# Patient Record
Sex: FEMALE | Race: WHITE | Employment: UNEMPLOYED | URBAN - METROPOLITAN AREA
[De-identification: names, ages, dates, MRNs, and addresses within clinical notes are randomized per-mention and may not be internally consistent; named-entity substitution may affect disease eponyms.]

---

## 2018-01-22 ENCOUNTER — APPOINTMENT (OUTPATIENT)
Dept: GENERAL RADIOLOGY | Age: 48
End: 2018-01-22
Attending: EMERGENCY MEDICINE
Payer: OTHER GOVERNMENT

## 2018-01-22 LAB
FLUAV AG NPH QL IA: NEGATIVE
FLUBV AG NPH QL IA: NEGATIVE

## 2018-01-22 PROCEDURE — 99283 EMERGENCY DEPT VISIT LOW MDM: CPT | Performed by: EMERGENCY MEDICINE

## 2018-01-22 PROCEDURE — 87804 INFLUENZA ASSAY W/OPTIC: CPT | Performed by: EMERGENCY MEDICINE

## 2018-01-22 PROCEDURE — 71046 X-RAY EXAM CHEST 2 VIEWS: CPT

## 2018-01-23 ENCOUNTER — HOSPITAL ENCOUNTER (EMERGENCY)
Age: 48
Discharge: HOME | End: 2018-01-23
Attending: EMERGENCY MEDICINE
Payer: OTHER GOVERNMENT

## 2018-01-23 VITALS
RESPIRATION RATE: 16 BRPM | OXYGEN SATURATION: 98 % | WEIGHT: 205 LBS | HEIGHT: 66 IN | DIASTOLIC BLOOD PRESSURE: 77 MMHG | HEART RATE: 108 BPM | TEMPERATURE: 100.9 F | SYSTOLIC BLOOD PRESSURE: 140 MMHG | BODY MASS INDEX: 32.95 KG/M2

## 2018-01-23 DIAGNOSIS — R50.9 ACUTE FEBRILE ILLNESS: Primary | ICD-10-CM

## 2018-01-23 LAB — DEPRECATED S PYO AG THROAT QL EIA: NEGATIVE

## 2018-01-23 PROCEDURE — 74011250637 HC RX REV CODE- 250/637: Performed by: EMERGENCY MEDICINE

## 2018-01-23 PROCEDURE — 87081 CULTURE SCREEN ONLY: CPT | Performed by: EMERGENCY MEDICINE

## 2018-01-23 PROCEDURE — 87880 STREP A ASSAY W/OPTIC: CPT | Performed by: EMERGENCY MEDICINE

## 2018-01-23 RX ORDER — AMOXICILLIN AND CLAVULANATE POTASSIUM 875; 125 MG/1; MG/1
1 TABLET, FILM COATED ORAL
Status: COMPLETED | OUTPATIENT
Start: 2018-01-23 | End: 2018-01-23

## 2018-01-23 RX ORDER — AMOXICILLIN AND CLAVULANATE POTASSIUM 875; 125 MG/1; MG/1
1 TABLET, FILM COATED ORAL 2 TIMES DAILY
Qty: 13 TAB | Refills: 0 | Status: SHIPPED | OUTPATIENT
Start: 2018-01-23 | End: 2018-03-01 | Stop reason: ALTCHOICE

## 2018-01-23 RX ORDER — IBUPROFEN 600 MG/1
600 TABLET ORAL ONCE
Status: COMPLETED | OUTPATIENT
Start: 2018-01-23 | End: 2018-01-23

## 2018-01-23 RX ADMIN — IBUPROFEN 600 MG: 600 TABLET, FILM COATED ORAL at 02:49

## 2018-01-23 RX ADMIN — AMOXICILLIN AND CLAVULANATE POTASSIUM 1 TABLET: 875; 125 TABLET, FILM COATED ORAL at 02:49

## 2018-01-23 NOTE — ED NOTES
Pt reports fever 104 starting this am, congestion, nausea, headache, left ear pain, body aches. Pt denies chest pain, dizziness, syncope, abdominal pain, vomiting, diarrhea, constipation, sob, urinary complaints, cough. nontender abdomen. +bowel sounds. Breath sounds clear and equal bilaterally. nonlabored breathing.

## 2018-01-23 NOTE — ED PROVIDER NOTES
HPI Comments: For somewhat over a week she has had some upper respiratory symptoms. During this time she has had fever and nasal congestion generalized achiness and mainly a nonproductive cough and states that she was somewhat worse at home tonight and took her temperature on a forehead thermometer resulting in 104 reading. Only new symptom is some left ear pain. Denies any sputum tonight. Patient is a 56 y.o. female presenting with flu symptoms. The history is provided by the patient. Flu   The current episode started more than 2 days ago. The problem occurs constantly. The cough is non-productive. Maximum temperature: 104. Associated symptoms include ear pain and sore throat. Pertinent negatives include no chills. She has tried nothing for the symptoms. She is not a smoker.         Past Medical History:   Diagnosis Date    Abnormal pap     colposcopy normal 2012    Allergic rhinitis     Anemia     Anxiety     Chronic tonsillitis     Depression     meds since approx 2007    Deviated septum     HTN (hypertension)     Hyperlipidemia     Hypothyroid     Interstitial cystitis     since 1997    Joint pain     Kidney infection     Pneumonia     Thyroid disease        Past Surgical History:   Procedure Laterality Date    HX COLPOSCOPY      HX OTHER SURGICAL  1997, 2012    cystoscopy     HX TONSILLECTOMY           Family History:   Problem Relation Age of Onset    Liver Disease Mother      cirrhosis (non-drinker)    Heart Disease Father     Stroke Father     Depression Father     Other Father      spinal stenosis    Dementia Father     Heart Disease Brother      unknown type       Social History     Social History    Marital status:      Spouse name: Martir Haider Number of children: 0    Years of education: N/A     Occupational History    stays at home      Social History Main Topics    Smoking status: Never Smoker    Smokeless tobacco: Never Used    Alcohol use 0.0 oz/week     0 Standard drinks or equivalent per week      Comment: social    Drug use: No    Sexual activity: Yes     Birth control/ protection: None     Other Topics Concern    Not on file     Social History Narrative     Homero Barahona works in [employer]: Sulfa (sulfonamide antibiotics)    Review of Systems   Constitutional: Positive for fever. Negative for chills and diaphoresis. HENT: Positive for congestion, ear pain and sore throat. Negative for facial swelling and mouth sores. Respiratory: Positive for cough. Musculoskeletal: Negative. Hematological: Negative. Psychiatric/Behavioral: Negative. All other systems reviewed and are negative. Vitals:    01/22/18 2137   BP: 133/78   Pulse: (!) 110   Resp: 18   Temp: 100.3 °F (37.9 °C)   SpO2: 96%   Weight: 93 kg (205 lb)   Height: 5' 6\" (1.676 m)            Physical Exam   Constitutional: She appears well-developed and well-nourished. No distress. Not toxic or septic   HENT:   Head: Atraumatic. Right Ear: Tympanic membrane and ear canal normal.   Left Ear: Tympanic membrane and ear canal normal.   Nose: Nose normal.   Mouth/Throat: Oropharyngeal exudate present. Red posterior throat  Scant yellow exudate  No strep odor   Eyes: No scleral icterus. Neck: Neck supple. Slight submandibular tenderness no dramatic lymphadenopathy appreciated   Cardiovascular: Normal rate and intact distal pulses. Pulmonary/Chest: Effort normal. No respiratory distress. Abdominal: Soft. There is no rebound and no guarding. Musculoskeletal: Normal range of motion. She exhibits no edema or deformity. Neurological: She is alert. No cranial nerve deficit. Coordination normal.   Skin: Skin is warm and dry. Psychiatric: Thought content normal.   Nursing note and vitals reviewed.        MDM  Number of Diagnoses or Management Options  Diagnosis management comments: multisymptom febrile illness including sore throat and left ear pain generalized body aches and a nonproductive cough. Consistent with a flulike illness. Flu swabs were done and negative. Chest x-ray also shows no focal infiltrate and patient has no sputum. She does have some left ear pain and a slight amount of exudate to the posterior pharynx. This could be either drainage or other.        Amount and/or Complexity of Data Reviewed  Clinical lab tests: ordered and reviewed  Tests in the radiology section of CPT®: reviewed and ordered  Obtain history from someone other than the patient: yes    Risk of Complications, Morbidity, and/or Mortality  Presenting problems: moderate  Diagnostic procedures: low  Management options: moderate    Patient Progress  Patient progress: stable    ED Course       Procedures    Recent Results (from the past 12 hour(s))   INFLUENZA A & B AG (RAPID TEST)    Collection Time: 01/22/18  9:46 PM   Result Value Ref Range    Influenza A Ag NEGATIVE  NEG      Influenza B Ag NEGATIVE  NEG

## 2018-01-25 LAB
BACTERIA SPEC CULT: NORMAL
SERVICE CMNT-IMP: NORMAL

## 2018-07-06 PROBLEM — E66.01 SEVERE OBESITY (BMI 35.0-39.9): Status: ACTIVE | Noted: 2018-07-06

## 2023-03-03 ENCOUNTER — HOSPITAL ENCOUNTER (OUTPATIENT)
Dept: MAMMOGRAPHY | Age: 53
Discharge: HOME | End: 2023-03-03
Payer: OTHER GOVERNMENT

## 2023-03-03 DIAGNOSIS — Z12.31 ENCOUNTER FOR SCREENING MAMMOGRAM FOR MALIGNANT NEOPLASM OF BREAST: ICD-10-CM

## 2023-03-03 PROCEDURE — 77067 SCR MAMMO BI INCL CAD: CPT

## 2023-06-19 NOTE — PROGRESS NOTES
[facility]  Consultation Note    Patient ID:  Name: Heidi Trivedi  MRN: [id number]  AGE: 47 y.o.  : 1970    Date of Consultation:  2023    CC:   Chief Complaint   Patient presents with    Back Pain         HPI:  Ms. Dee Lee is a 60-year-old female who presents today for evaluation of low back pain. I last saw her in 2019 with pain in the low back. The pain is in the low back. It radiates to the buttocks into the bilateral posterior lateral legs. It stops at her knees. The pain is associated with lower extremity paresthesias. The pain is aggravated with standing or walking. It is alleviated with sitting. She has not recently been awakening at night because of the pain but does take some medications at bed that make her sleep better. The pain is associated with a positive grocery cart sign. She has perform physician guided home exercises daily since 2022 and continuing currently. These have not helped. She rates the pain as 8/10 in severity. The pain severely affects her functioning. Cymbalta has not helped. Tizanidine and gabapentin have helped some. She has not had any recent lumbar spine imaging. MRI lumbar spine 2019 showed central stenosis at L3-4 and multilevel degenerative change.      Past Medical History Includes:   Past Medical History:   Diagnosis Date    Abnormal pap     colposcopy normal     Allergic rhinitis     Anemia     Anxiety     Chronic tonsillitis     Depression     meds since 2007    Deviated septum     HTN (hypertension)     Hyperlipidemia     Hypothyroid     Interstitial cystitis     since     Joint pain     Kidney infection     Pneumonia     Thyroid disease    ,   Past Surgical History:   Procedure Laterality Date    COLPOSCOPY      OTHER SURGICAL HISTORY  ,     cystoscopy     TONSILLECTOMY       Family History:   Family History   Problem Relation Age of Onset    Liver Disease Mother         cirrhosis

## 2023-06-21 ENCOUNTER — OFFICE VISIT (OUTPATIENT)
Dept: ORTHOPEDIC SURGERY | Age: 53
End: 2023-06-21
Payer: OTHER GOVERNMENT

## 2023-06-21 ENCOUNTER — TELEPHONE (OUTPATIENT)
Dept: ORTHOPEDIC SURGERY | Age: 53
End: 2023-06-21

## 2023-06-21 DIAGNOSIS — M47.816 LUMBAR SPONDYLOSIS: ICD-10-CM

## 2023-06-21 DIAGNOSIS — M48.062 SPINAL STENOSIS OF LUMBAR REGION WITH NEUROGENIC CLAUDICATION: ICD-10-CM

## 2023-06-21 DIAGNOSIS — M51.36 DISC DEGENERATION, LUMBAR: ICD-10-CM

## 2023-06-21 DIAGNOSIS — M54.50 LOW BACK PAIN, UNSPECIFIED BACK PAIN LATERALITY, UNSPECIFIED CHRONICITY, UNSPECIFIED WHETHER SCIATICA PRESENT: Primary | ICD-10-CM

## 2023-06-21 PROCEDURE — 99204 OFFICE O/P NEW MOD 45 MIN: CPT | Performed by: PHYSICAL MEDICINE & REHABILITATION

## 2023-06-21 RX ORDER — HYDROCHLOROTHIAZIDE 25 MG/1
TABLET ORAL
COMMUNITY
Start: 2023-05-02

## 2023-06-21 RX ORDER — LISDEXAMFETAMINE DIMESYLATE 70 MG/1
CAPSULE ORAL
COMMUNITY
Start: 2023-06-10

## 2023-06-21 RX ORDER — BREXPIPRAZOLE 3 MG/1
TABLET ORAL
COMMUNITY
Start: 2023-04-21

## 2023-06-21 RX ORDER — HYDROXYZINE PAMOATE 25 MG/1
CAPSULE ORAL
COMMUNITY
Start: 2023-04-21

## 2023-06-21 RX ORDER — ALPRAZOLAM 1 MG/1
TABLET ORAL
Qty: 1 TABLET | Refills: 0 | Status: SHIPPED | OUTPATIENT
Start: 2023-06-21 | End: 2023-06-21

## 2023-06-21 RX ORDER — BUPROPION HYDROBROMIDE 522 MG/1
TABLET, EXTENDED RELEASE ORAL
COMMUNITY
Start: 2023-05-12

## 2023-06-21 RX ORDER — TIZANIDINE 4 MG/1
TABLET ORAL
COMMUNITY
Start: 2023-03-27

## 2023-06-21 NOTE — TELEPHONE ENCOUNTER
They received a RX for a Xanax. She is already on clonazepam. Is it okay to fill the Xanax? Please give them a call.

## 2023-06-22 NOTE — TELEPHONE ENCOUNTER
Call returned to pharmacy to let the know it was okay to fill the prescription due to it being a single dose prescription for the MRI.

## 2023-08-28 NOTE — PROGRESS NOTES
tobacco: Never   Substance Use Topics    Alcohol use: Yes     Alcohol/week: 0.0 standard drinks       ALLERGIES:   Allergies   Allergen Reactions    Sulfa Antibiotics Other (See Comments)     GI distress        Patient Medications    Current Outpatient Medications   Medication Sig    ALPRAZolam (XANAX) 1 MG tablet Take 1 tablet by mouth one hour prior to procedure. methocarbamol (ROBAXIN-750) 750 MG tablet Take 1 tablet by mouth 3 times daily    REXULTI 3 MG TABS tablet     APLENZIN 522 MG TB24     hydrOXYzine pamoate (VISTARIL) 25 MG capsule     VYVANSE 70 MG capsule     hydroCHLOROthiazide (HYDRODIURIL) 25 MG tablet     tiZANidine (ZANAFLEX) 4 MG tablet     acetaminophen (TYLENOL) 500 MG tablet Take by mouth every 6 hours as needed    busPIRone (BUSPAR) 15 MG tablet Take 15 mg by mouth 3 times daily    clonazePAM (KLONOPIN) 0.5 MG tablet Take 0.5 mg by mouth 2 times daily.     diphenhydrAMINE-APAP, sleep, (TYLENOL PM EXTRA STRENGTH)  MG tablet Take by mouth    DULoxetine (CYMBALTA) 60 MG extended release capsule Take 120 mg by mouth daily    Gabapentin, Once-Daily, 600 MG TABS 3 tabs PO daily with dinner Indications: take 3 tablets once daily with dinner    HYDROcodone-acetaminophen (NORCO) 7.5-325 MG per tablet 1 tab po daily prn severe pain    levothyroxine (SYNTHROID) 125 MCG tablet Take 125 mcg by mouth every morning (before breakfast)    lisinopril-hydroCHLOROthiazide (PRINZIDE;ZESTORETIC) 20-12.5 MG per tablet Take 1 tablet by mouth daily    loratadine (CLARITIN) 10 MG tablet Take 10 mg by mouth daily    meloxicam (MOBIC) 15 MG tablet Take 15 mg by mouth daily    montelukast (SINGULAIR) 10 MG tablet Take 10 mg by mouth daily    nebivolol (BYSTOLIC) 10 MG tablet Take 10 mg by mouth daily    nitrofurantoin (MACRODANTIN) 100 MG capsule TAKE 1 CAP BY MOUTH NIGHTLY.    simvastatin (ZOCOR) 20 MG tablet TAKE 1 TABLET AT BEDTIME    tolterodine (DETROL LA) 4 MG extended release capsule TAKE 1 CAPSULE EVERY

## 2023-08-30 ENCOUNTER — OFFICE VISIT (OUTPATIENT)
Dept: ORTHOPEDIC SURGERY | Age: 53
End: 2023-08-30
Payer: OTHER GOVERNMENT

## 2023-08-30 DIAGNOSIS — M54.16 LUMBAR RADICULOPATHY: ICD-10-CM

## 2023-08-30 DIAGNOSIS — M47.816 LUMBAR SPONDYLOSIS: ICD-10-CM

## 2023-08-30 DIAGNOSIS — M51.36 DISC DEGENERATION, LUMBAR: Primary | ICD-10-CM

## 2023-08-30 PROCEDURE — 99214 OFFICE O/P EST MOD 30 MIN: CPT | Performed by: PHYSICAL MEDICINE & REHABILITATION

## 2023-08-30 RX ORDER — ALPRAZOLAM 1 MG/1
TABLET ORAL
Qty: 1 TABLET | Refills: 0 | Status: SHIPPED | OUTPATIENT
Start: 2023-08-30 | End: 2023-09-30

## 2023-08-30 RX ORDER — METHOCARBAMOL 750 MG/1
750 TABLET, FILM COATED ORAL 3 TIMES DAILY
Qty: 60 TABLET | Refills: 1 | Status: SHIPPED | OUTPATIENT
Start: 2023-08-30 | End: 2023-09-29

## 2023-09-11 ENCOUNTER — OFFICE VISIT (OUTPATIENT)
Dept: ORTHOPEDIC SURGERY | Age: 53
End: 2023-09-11
Payer: OTHER GOVERNMENT

## 2023-09-11 DIAGNOSIS — M47.816 LUMBAR SPONDYLOSIS: ICD-10-CM

## 2023-09-11 DIAGNOSIS — M54.16 LUMBAR RADICULOPATHY: ICD-10-CM

## 2023-09-11 DIAGNOSIS — M51.36 DISC DEGENERATION, LUMBAR: Primary | ICD-10-CM

## 2023-09-11 PROCEDURE — 64493 INJ PARAVERT F JNT L/S 1 LEV: CPT | Performed by: PHYSICAL MEDICINE & REHABILITATION

## 2023-09-11 PROCEDURE — 64494 INJ PARAVERT F JNT L/S 2 LEV: CPT | Performed by: PHYSICAL MEDICINE & REHABILITATION

## 2023-09-11 RX ORDER — BETAMETHASONE SODIUM PHOSPHATE AND BETAMETHASONE ACETATE 3; 3 MG/ML; MG/ML
12 INJECTION, SUSPENSION INTRA-ARTICULAR; INTRALESIONAL; INTRAMUSCULAR; SOFT TISSUE ONCE
Status: CANCELLED | OUTPATIENT
Start: 2023-09-11 | End: 2023-09-11

## 2023-09-11 RX ORDER — TRIAMCINOLONE ACETONIDE 40 MG/ML
40 INJECTION, SUSPENSION INTRA-ARTICULAR; INTRAMUSCULAR ONCE
Status: COMPLETED | OUTPATIENT
Start: 2023-09-11 | End: 2023-09-11

## 2023-09-11 RX ADMIN — TRIAMCINOLONE ACETONIDE 40 MG: 40 INJECTION, SUSPENSION INTRA-ARTICULAR; INTRAMUSCULAR at 16:22

## 2023-09-11 NOTE — PROGRESS NOTES
Name: Savanah Becerra  YOB: 1970  Gender: female  MRN: [id number]    Procedure: Bilateral  L3-L4 and L4-L5 facet joint injections     Precautions: Savanah Becerra deniesprior sensitivity to steroid, local anesthetic, iodine, or shellfish. The procedure was discussed at length with her and informed consent was signed and placed in the chart. She was placed in a prone position on the fluoroscopy table and the skin was prepped and draped in a routine sterile fashion. The areas to be injected were each anesthetized with 1% lidocaine. Next, a 25-gauge 3.5 inch spinal needle was carefully advanced under fluoroscopic guidance to the rightL4 - L5 facet joint. Aspiration was negative. Once proper placement was confirmed, 1 ml of 0.25% Marcaine and  0.5 mL 40 mg/mL kenalog were injected through the spinal needle. The above procedure was then repeated through the rightL3 - L4, and left L4 - L5, and leftL3 - L4 facet joints. Fluoroscopic guidance was used intermittently over a 10-minute period to insure proper needle placement and her safety. A hard copy of the fluoroscopic images has been placed in her chart and is saved on the C-arm hard drive. She was monitored for 30 minutes after the procedure and discharged home in a stable fashion with a routine follow up. Procedural Diagnosis:     ICD-10-CM    1. Disc degeneration, lumbar  M51.36 FL INJ LUMB/SAC FACET SINGLE LEVEL     XR INJ FACET LUMB SACRAL 2ND LVL     triamcinolone acetonide (KENALOG-40) injection 40 mg      2. Lumbar spondylosis  M47.816 FL INJ LUMB/SAC FACET SINGLE LEVEL     XR INJ FACET LUMB SACRAL 2ND LVL     triamcinolone acetonide (KENALOG-40) injection 40 mg      3.  Lumbar radiculopathy  M54.16 FL INJ LUMB/SAC FACET SINGLE LEVEL     XR INJ FACET LUMB SACRAL 2ND LVL     triamcinolone acetonide (KENALOG-40) injection 40 mg           Nicole Melendrez MD  09/11/23

## 2024-04-24 ENCOUNTER — OFFICE VISIT (OUTPATIENT)
Dept: ORTHOPEDIC SURGERY | Age: 54
End: 2024-04-24
Payer: OTHER GOVERNMENT

## 2024-04-24 VITALS — HEIGHT: 66 IN | BODY MASS INDEX: 37.93 KG/M2 | WEIGHT: 236 LBS

## 2024-04-24 DIAGNOSIS — M25.562 LEFT KNEE PAIN, UNSPECIFIED CHRONICITY: ICD-10-CM

## 2024-04-24 DIAGNOSIS — M54.16 LUMBAR RADICULOPATHY: ICD-10-CM

## 2024-04-24 DIAGNOSIS — M25.561 RIGHT KNEE PAIN, UNSPECIFIED CHRONICITY: Primary | ICD-10-CM

## 2024-04-24 DIAGNOSIS — M51.36 DISC DEGENERATION, LUMBAR: ICD-10-CM

## 2024-04-24 DIAGNOSIS — M47.816 LUMBAR SPONDYLOSIS: ICD-10-CM

## 2024-04-24 PROCEDURE — 99214 OFFICE O/P EST MOD 30 MIN: CPT | Performed by: PHYSICAL MEDICINE & REHABILITATION

## 2024-04-24 RX ORDER — BREXPIPRAZOLE 2 MG/1
TABLET ORAL
COMMUNITY
Start: 2024-04-07

## 2024-04-24 RX ORDER — SIMVASTATIN 40 MG
TABLET ORAL
COMMUNITY
Start: 2024-01-25

## 2024-04-24 RX ORDER — ALPRAZOLAM 1 MG/1
TABLET ORAL
Qty: 1 TABLET | Refills: 0 | Status: SHIPPED | OUTPATIENT
Start: 2024-04-24 | End: 2024-05-30

## 2024-04-24 NOTE — PROGRESS NOTES
[facility]  Consultation Note    Patient ID:  Name: Pretty Mercedes  MRN: [id number]  AGE: 53 y.o.  : 1970    Date of Consultation:  2024    CC:   Chief Complaint   Patient presents with    Back Pain         HPI:  Ms. Mercedes is a 53-year-old female who presents today for follow-up of low back pain.  She has pain in the central and bilateral low back.  The pain radiates to her bilateral hips.  She underwent bilateral L3-4-5 facet joint injection 2023.  She is not sure if that helped.  She also has pain in the bilateral knees.  The pain is associated with lower extremity paresthesias, only when she sits in a crosslegged position on the floor.  Her back pain is aggravated with standing still.  It is alleviated when she changes her weight position from leg to leg while standing.  She has a positive grocery cart sign.  The pain does not awaken her at night.  She tried tizanidine without relief.  She has just started methocarbamol today and is not sure if that has helped or not.  She is chronically on meloxicam 15 mg a day, Cymbalta 60 mg a day, and gralise 1800 mg at bedtime.    MRI lumbar spine 2022 showed multilevel degenerative disc changes, spondylosis, facet arthropathy of the lumbar spine with lateral recess narrowing bilaterally at L4-5.     Past Medical History Includes:   Past Medical History:   Diagnosis Date    Abnormal pap     colposcopy normal     Allergic rhinitis     Anemia     Anxiety     Chronic tonsillitis     Depression     meds since 2007    Deviated septum     HTN (hypertension)     Hyperlipidemia     Hypothyroid     Interstitial cystitis     since     Joint pain     Kidney infection     Pneumonia     Thyroid disease    ,   Past Surgical History:   Procedure Laterality Date    COLPOSCOPY      OTHER SURGICAL HISTORY  ,     cystoscopy     TONSILLECTOMY       Family History:   Family History   Problem Relation Age of Onset

## 2024-04-26 DIAGNOSIS — M51.36 DISC DEGENERATION, LUMBAR: ICD-10-CM

## 2024-04-26 DIAGNOSIS — M48.062 SPINAL STENOSIS OF LUMBAR REGION WITH NEUROGENIC CLAUDICATION: ICD-10-CM

## 2024-04-26 DIAGNOSIS — M54.50 LOW BACK PAIN, UNSPECIFIED BACK PAIN LATERALITY, UNSPECIFIED CHRONICITY, UNSPECIFIED WHETHER SCIATICA PRESENT: ICD-10-CM

## 2024-04-26 DIAGNOSIS — M54.16 LUMBAR RADICULOPATHY: Primary | ICD-10-CM

## 2024-05-02 ENCOUNTER — OFFICE VISIT (OUTPATIENT)
Dept: ORTHOPEDIC SURGERY | Age: 54
End: 2024-05-02
Payer: OTHER GOVERNMENT

## 2024-05-02 DIAGNOSIS — M51.36 DISC DEGENERATION, LUMBAR: ICD-10-CM

## 2024-05-02 DIAGNOSIS — M54.16 LUMBAR RADICULOPATHY: Primary | ICD-10-CM

## 2024-05-02 DIAGNOSIS — M48.062 SPINAL STENOSIS OF LUMBAR REGION WITH NEUROGENIC CLAUDICATION: ICD-10-CM

## 2024-05-02 DIAGNOSIS — M54.50 LOW BACK PAIN, UNSPECIFIED BACK PAIN LATERALITY, UNSPECIFIED CHRONICITY, UNSPECIFIED WHETHER SCIATICA PRESENT: ICD-10-CM

## 2024-05-02 PROCEDURE — 64483 NJX AA&/STRD TFRM EPI L/S 1: CPT | Performed by: PHYSICAL MEDICINE & REHABILITATION

## 2024-05-02 RX ORDER — TRIAMCINOLONE ACETONIDE 40 MG/ML
40 INJECTION, SUSPENSION INTRA-ARTICULAR; INTRAMUSCULAR ONCE
Status: COMPLETED | OUTPATIENT
Start: 2024-05-02 | End: 2024-05-02

## 2024-05-02 RX ADMIN — TRIAMCINOLONE ACETONIDE 40 MG: 40 INJECTION, SUSPENSION INTRA-ARTICULAR; INTRAMUSCULAR at 10:11

## 2024-05-02 NOTE — PROGRESS NOTES
triamcinolone acetonide (KENALOG-40) injection 40 mg      2. Disc degeneration, lumbar  M51.36 FL NERVE BLOCK LUMBOSACRAL 1ST     triamcinolone acetonide (KENALOG-40) injection 40 mg      3. Low back pain, unspecified back pain laterality, unspecified chronicity, unspecified whether sciatica present  M54.50 FL NERVE BLOCK LUMBOSACRAL 1ST     triamcinolone acetonide (KENALOG-40) injection 40 mg      4. Spinal stenosis of lumbar region with neurogenic claudication  M48.062 FL NERVE BLOCK LUMBOSACRAL 1ST     triamcinolone acetonide (KENALOG-40) injection 40 mg         JULISSA SMITH MD  05/02/24

## 2024-06-05 ENCOUNTER — OFFICE VISIT (OUTPATIENT)
Dept: ORTHOPEDIC SURGERY | Age: 54
End: 2024-06-05
Payer: OTHER GOVERNMENT

## 2024-06-05 DIAGNOSIS — M22.42 CHONDROMALACIA, PATELLA, LEFT: Primary | ICD-10-CM

## 2024-06-05 DIAGNOSIS — M22.41 CHONDROMALACIA, PATELLA, RIGHT: ICD-10-CM

## 2024-06-05 PROCEDURE — 99203 OFFICE O/P NEW LOW 30 MIN: CPT | Performed by: STUDENT IN AN ORGANIZED HEALTH CARE EDUCATION/TRAINING PROGRAM

## 2024-06-05 RX ORDER — MELOXICAM 7.5 MG/1
7.5 TABLET ORAL DAILY PRN
Qty: 30 TABLET | Refills: 1 | Status: SHIPPED | OUTPATIENT
Start: 2024-06-05 | End: 2024-08-04

## 2024-06-05 NOTE — PROGRESS NOTES
Name: Pretty Mercedes  YOB: 1970  Gender: female  MRN: [id number]  Date of Encounter:  6/5/2024       CHIEF COMPLAINT:     Chief Complaint   Patient presents with    Knee Pain     Bilateral        SUBJECTIVE/OBJECTIVE:      HPI:    Patient is a 53 y.o. pleasant female who presents today for a new evaluation of her bilateral knee pain. She is a patient of Dr. Hudson for her back and receives injections.     Mrs. Mercedes describes bilateral knee pain that began a few months ago whenever she is bending/squatting and standing. She notes popping/clicking noises with no pain associated. She cannot recall any DAVID. She denies any previous history of injury or any swelling to the joint. Mrs. Mercedes has never had any knee CSIs or PT. She has chronic history of lumbar spine DDD, stenosis, and radiculopathy.      PAST HISTORY:   Past medical, surgical, family, social history and allergies reviewed by me.   Pertinent history:   Tobacco use:  reports that she has never smoked. She has never used smokeless tobacco.  Diabetes: none  No results found for: \"LABA1C\"  Anticoagulation: no      REVIEW OF SYSTEMS:   As noted in HPI.     PHYSICAL EXAMINATION:     Gen: Well-developed, no acute distress   HEENT: NC/AT, EOMI   Neck: Trachea midline, normal ROM   CV: Regular rhythm by palpation of distal pulse, normal capillary refill   Pulm: No respiratory distress, no stridor   Psychiatric: Well oriented, normal mood and affect.   Skin: No rashes, lesions or ulcers, normal temperature, turgor, and texture on uninvolved extremity.      ORTHO EXAM:    Bilateral Knee:     Inspection: No deformity, No erythema  Palpation:  No effusion ; mild anterior and medial crepitus, Patellar mobility normal  ROM: 130 flexion, 0 extension, slow to flex and stiff   Tenderness: Medial joint line and patellar facet tenderness medially  Provocative testing: Patellar grind positive.  Medial Wendi is painful.  No laxity with anterior posterior drawer,

## 2024-06-11 ENCOUNTER — HOSPITAL ENCOUNTER (OUTPATIENT)
Dept: PHYSICAL THERAPY | Age: 54
Setting detail: RECURRING SERIES
Discharge: HOME | End: 2024-06-14
Attending: STUDENT IN AN ORGANIZED HEALTH CARE EDUCATION/TRAINING PROGRAM
Payer: OTHER GOVERNMENT

## 2024-06-11 DIAGNOSIS — G89.29 CHRONIC PAIN OF BOTH KNEES: Primary | ICD-10-CM

## 2024-06-11 DIAGNOSIS — M25.662 KNEE JOINT STIFFNESS, BILATERAL: ICD-10-CM

## 2024-06-11 DIAGNOSIS — M25.561 CHRONIC PAIN OF BOTH KNEES: Primary | ICD-10-CM

## 2024-06-11 DIAGNOSIS — M62.81 MUSCLE WEAKNESS (GENERALIZED): ICD-10-CM

## 2024-06-11 DIAGNOSIS — M25.562 CHRONIC PAIN OF BOTH KNEES: Primary | ICD-10-CM

## 2024-06-11 DIAGNOSIS — M25.661 KNEE JOINT STIFFNESS, BILATERAL: ICD-10-CM

## 2024-06-11 DIAGNOSIS — R26.2 DIFFICULTY IN WALKING, NOT ELSEWHERE CLASSIFIED: ICD-10-CM

## 2024-06-11 PROCEDURE — 97161 PT EVAL LOW COMPLEX 20 MIN: CPT

## 2024-06-11 PROCEDURE — 97110 THERAPEUTIC EXERCISES: CPT

## 2024-06-11 ASSESSMENT — PAIN SCALES - GENERAL: PAINLEVEL_OUTOF10: 5

## 2024-06-11 NOTE — THERAPY EVALUATION
performance of ADL's with greater ease  Independent HEP of comprehensive LE strengthening exercises         Outcome Measure:   Tool Used: Lower Extremity Functional Scale (LEFS)  Score:  Initial: 24/80 Most Recent: X/80 (Date: -- )   Interpretation of Score: 20 questions each scored on a 5 point scale with 0 representing \"extreme difficulty or unable to perform\" and 4 representing \"no difficulty\".  The lower the score, the greater the functional disability. 80/80 represents no disability.  Minimal detectable change is 9 points.    Medical Necessity:   > Patient is expected to demonstrate progress in strength, range of motion, and balance to allow for performance of ADL's with greater ease.  Reason For Services/Other Comments:  >  Patient will require skilled therapeutic intervention to address impairments/deficits assessed to allow for performance of ADL's with greater ease      Regarding Pretty Mercedes's therapy, I certify that the treatment plan above will be carried out by a therapist or under their direction.  Thank you for this referral,  Louis Snow PT     Referring Physician Signature: Nevin Jenkins MD                    Charge Capture  Events  Appt Desk  Attendance Report

## 2024-06-12 NOTE — PROGRESS NOTES
Pretty Mago  : 1970  Primary:  [facility] (Other)  Secondary:  [facility]  [address]  [address]  [facility] [address]  Phone: [phone]  Fax: [phone] Plan Frequency: 2-3x week for 6 weeks    Plan of Care/Certification Expiration Date: 08/10/24        Plan of Care/Certification Expiration Date:  Plan of Care/Certification Expiration Date: 08/10/24    Frequency/Duration:   Plan Frequency: 2-3x week for 6 weeks      Time In/Out:   Time In: 0815  Time Out: 0900      PT Visit Info:    Total # of Visits to Date: 1      Visit Count:  1    OUTPATIENT PHYSICAL THERAPY:   Treatment Note 2024       Episode  (Bilateral Knee pain)               Treatment Diagnosis:    Chronic pain of both knees  Knee joint stiffness, bilateral  Muscle weakness (generalized)  Difficulty in walking, not elsewhere classified  Medical/Referring Diagnosis:    Chondromalacia, patella, 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 Patient also reports pain with stair climbing; worse with descending stairs with limitations in standing to 5 minutes and walking to less than 15 minutes   Initial Pain Level::     5/10  Post Session Pain Level:        /10  Medications Last Reviewed:  2024  Updated Objective Findings:  See Evaluation Note from today  Treatment   THERAPEUTIC EXERCISE: (23 minutes):    Exercises per grid below to improve mobility and strength.  Required minimal verbal cues to promote proper body alignment.  Progressed range

## 2024-06-24 ENCOUNTER — HOSPITAL ENCOUNTER (OUTPATIENT)
Dept: PHYSICAL THERAPY | Age: 54
Setting detail: RECURRING SERIES
Discharge: HOME | End: 2024-06-27
Attending: STUDENT IN AN ORGANIZED HEALTH CARE EDUCATION/TRAINING PROGRAM
Payer: OTHER GOVERNMENT

## 2024-06-24 PROCEDURE — 97110 THERAPEUTIC EXERCISES: CPT

## 2024-06-24 ASSESSMENT — PAIN SCALES - GENERAL: PAINLEVEL_OUTOF10: 3

## 2024-06-24 NOTE — PROGRESS NOTES
Desdune-Chris, Louis C, PT [facility]   7/2/2024  9:00 AM Desdune-Chris, Louis C, PT [facility]   7/3/2024  9:00 AM Desdune-Chris, Louis C, PT [facility]   7/8/2024  9:45 AM Desdune-Chris, Louis C, PT [facility]   7/10/2024  2:30 PM Desdune-Chris, Louis C, PT [facility]   7/15/2024  9:00 AM Desdune-Chris, Louis C, PT [facility]   7/17/2024  9:00 AM Desdune-Chris, Louis C, PT [facility]   7/22/2024  9:00 AM Desdune-Chris, Louis C, PT [facility]   7/24/2024  9:00 AM Desdune-Chris, Louis C, PT [facility]   7/26/2024 11:00 AM Desdune-Chris, Louis C, PT [facility]   7/31/2024  9:00 AM Desdune-Chris, Louis C, PT [facility]   7/31/2024  2:45 PM Nevin Jenkins MD [facility]

## 2024-06-25 ENCOUNTER — HOSPITAL ENCOUNTER (OUTPATIENT)
Dept: NUTRITION | Age: 54
Setting detail: RECURRING SERIES
Discharge: HOME | End: 2024-06-28
Payer: OTHER GOVERNMENT

## 2024-06-25 PROCEDURE — 97802 MEDICAL NUTRITION INDIV IN: CPT

## 2024-06-25 NOTE — PROGRESS NOTES
Kaya Aleman, MS, RD, LD  Outpatient Registered Dietitian  [facility]  Phone: [phone]  Fax: [phone]  INITIAL ASSESSMENT (6/25/2024)  Pretty Mercedes is a 53 y.o. female referred with the following diagnosis (es): Fatty (change of) liver, not elsewhere classified [K76.0]   PMH includes HTN, HLD, hypothyroidism, anxiety, depression, DDD  Labs: 11/6/2023 Total Chol 207 (H) TRIG 228 (H) HDL 46 .4 (H)   Meds: simvastatin, hctz,     Patient stated goal: weight loss    Started tracking her meals using harjeet but has made no changes to diet yet. Identifies she needs to drink less sweet tea. Typically skips lunch, eats out majority of meals,  will cook on a weekend day. Patient doesn't like to cook and unable to due to back pain. Only eats vegetables when spouse cooks at home, she doesn't love fruit.     Will eat: tomatoes, broccoli, cooked carrots, cucumbers, mushrooms, strawberries, apples, bananas, grapes, pears, pineapple     Factors impacting food choices:   -Established food preferences/eating behaviors  -Dining out more than 50% of meals  -Lack of planning for meals/shopping  -Current nutrition attitudes/beliefs  -Poor snack choices  -Skips meals    Initial Diet Recall (kept 3-day log in [employer]et):   6/21: B: 8oz OJ, Bocarla rochelle-berry biscuit, 2 pork sausages, 8oz water  L:  D: 16oz sweet tea, Tipsy Taco 2 soft tacos with beef, cheese, lettuce, 4 churros, 1/2 cup vanilla ice cream  Snack: 1 cup skim milk, 1.5 chips ahoy cookies     6/22: B: 8oz water, 2 blueberry eggo waffles, 2 TB light syrup, 1 TB light butter, 8oz OJ  L:   D: 5 fried shrimp, Outback everything baked sweet potato, 1 TB butter, side salad with ranch, 3 slices italian bread, 8oz fluid sweet tea  Snack: 8oz sweet tea, 2 chips ahoy cookies     6/23: B: Tucker maple brown sugar potato bread, 10oz OJ  L: 36oz sweet tea, 1/2 Brixx roasted chicken pizza, 1/2 piece tiramisu cake   D: 4 oz honey

## 2024-06-26 ENCOUNTER — HOSPITAL ENCOUNTER (OUTPATIENT)
Dept: PHYSICAL THERAPY | Age: 54
Setting detail: RECURRING SERIES
Discharge: HOME | End: 2024-06-29
Attending: STUDENT IN AN ORGANIZED HEALTH CARE EDUCATION/TRAINING PROGRAM
Payer: OTHER GOVERNMENT

## 2024-06-26 PROCEDURE — 97110 THERAPEUTIC EXERCISES: CPT

## 2024-06-26 ASSESSMENT — PAIN SCALES - GENERAL: PAINLEVEL_OUTOF10: 4

## 2024-06-26 NOTE — PROGRESS NOTES
Pretty Mago  : 1970  Primary:  [facility] (Other)  Secondary:  [facility]  [address]  [address]  [facility] [address]  Phone: [phone]  Fax: [phone] Plan Frequency: 2-3x week for 6 weeks    Plan of Care/Certification Expiration Date: 08/10/24        Plan of Care/Certification Expiration Date:  Plan of Care/Certification Expiration Date: 08/10/24    Frequency/Duration:   Plan Frequency: 2-3x week for 6 weeks      Time In/Out:   Time In: 0900  Time Out: 0950      PT Visit Info:    Total # of Visits to Date: 2      Visit Count:  3    OUTPATIENT PHYSICAL THERAPY:   Treatment Note 2024       Episode  (Bilateral Knee pain)               Treatment Diagnosis:    Chronic pain of both knees  Knee joint stiffness, bilateral  Muscle weakness (generalized)  Difficulty in walking, not elsewhere classified  Medical/Referring Diagnosis:    Chondromalacia, patella, 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  Low back pain remains at 5/6 with movement  Initial Pain Level::     4/10  Post Session Pain Level:       3/10  Medications Last Reviewed:  2024  Updated Objective Findings:  None Today  Treatment   THERAPEUTIC EXERCISE: (45 minutes):    Exercises per grid below to improve mobility, strength, and coordination.  Required minimal visual and verbal cues to promote proper body alignment.  Progressed range, repetitions, and complexity of movement as indicated.   Date:  24 Date:  24 Date:  24   Activity/Exercise Parameters Parameters Parameters   Patient

## 2024-07-01 ENCOUNTER — APPOINTMENT (OUTPATIENT)
Dept: PHYSICAL THERAPY | Age: 54
End: 2024-07-01
Attending: STUDENT IN AN ORGANIZED HEALTH CARE EDUCATION/TRAINING PROGRAM
Payer: OTHER GOVERNMENT

## 2024-07-02 ENCOUNTER — HOSPITAL ENCOUNTER (OUTPATIENT)
Dept: PHYSICAL THERAPY | Age: 54
Setting detail: RECURRING SERIES
Discharge: HOME | End: 2024-07-05
Attending: STUDENT IN AN ORGANIZED HEALTH CARE EDUCATION/TRAINING PROGRAM
Payer: OTHER GOVERNMENT

## 2024-07-02 PROCEDURE — 97110 THERAPEUTIC EXERCISES: CPT

## 2024-07-02 ASSESSMENT — PAIN SCALES - GENERAL: PAINLEVEL_OUTOF10: 3

## 2024-07-02 NOTE — PROGRESS NOTES
In: 0900  Time Out: 0945    Louis Snow, PT         Charge Capture  Events  [employer] Portal  Appt Desk  Attendance Report     Future Appointments   Date Time Provider Department Center   7/3/2024  9:00 AM Louis Snow, PT [facility]   7/8/2024  9:45 AM Louis Snow, PT [facility]   7/10/2024  2:30 PM Louis Snow, PT [facility]   7/15/2024  9:00 AM DesLouis Noriega, PT [facility]   7/17/2024  9:00 AM Louis Snow, PT [facility]   7/22/2024  9:00 AM Descyndi-Louis Perez, PT [facility]   7/24/2024  9:00 AM Louis Snow, PT [facility]   7/26/2024 11:00 AM Descyndi-Louis Perez, PT [facility]   7/31/2024  9:00 AM Desdune-Louis Perez, PT [facility]   7/31/2024  2:45 PM Nevin Jenkins MD [facility]

## 2024-07-03 ENCOUNTER — HOSPITAL ENCOUNTER (OUTPATIENT)
Dept: PHYSICAL THERAPY | Age: 54
Setting detail: RECURRING SERIES
Discharge: HOME | End: 2024-07-06
Attending: STUDENT IN AN ORGANIZED HEALTH CARE EDUCATION/TRAINING PROGRAM
Payer: OTHER GOVERNMENT

## 2024-07-03 PROCEDURE — 97110 THERAPEUTIC EXERCISES: CPT

## 2024-07-03 ASSESSMENT — PAIN SCALES - GENERAL: PAINLEVEL_OUTOF10: 3

## 2024-07-03 NOTE — PROGRESS NOTES
Pretty Mago  : 1970  Primary:  [facility]  Secondary:  [facility]  [address]  [address]  [facility] [address]  Phone: [phone]  Fax: [phone] Plan Frequency: 2-3x week for 6 weeks    Plan of Care/Certification Expiration Date: 08/10/24        Plan of Care/Certification Expiration Date:  Plan of Care/Certification Expiration Date: 08/10/24    Frequency/Duration:   Plan Frequency: 2-3x week for 6 weeks      Time In/Out:   Time In: 830  Time Out: 915      PT Visit Info:    Total # of Visits to Date: 5      Visit Count:  5    OUTPATIENT PHYSICAL THERAPY:   Treatment Note 7/3/2024       Episode  (Bilateral Knee pain)               Treatment Diagnosis:    Chronic pain of both knees  Knee joint stiffness, bilateral  Muscle weakness (generalized)  Difficulty in walking, not elsewhere classified  Medical/Referring Diagnosis:    Chondromalacia, patella, 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]  Chondromalacia, patella, 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]      Referring Physician:  Nevin Jenkins MD MD Orders:  PT Eval and Treat   Return MD Appt:  24   Date of Onset:  Onset Date: 23     Allergies:   Sulfa antibiotics  Restrictions/Precautions:   None      Interventions Planned (Treatment may consist of any combination of the following):     See Assessment Note    Subjective Comments:   Patient reports doing better with her knee, but her low back pain remains at 6/10  Initial Pain Level::     3/10  Post Session Pain Level:       3/10  Medications Last Reviewed:  7/3/2024  Updated Objective Findings:  None Today  Treatment   THERAPEUTIC EXERCISE: (40 minutes):    Exercises per grid below to improve mobility, strength, and coordination.  Required minimal visual and verbal cues to promote proper body alignment.  Progressed range, repetitions, and complexity of movement as indicated.   Date:  24 Date:  24 Date:  24 Date:  24 Date:  7/3/24   Activity/Exercise Parameters Parameters Parameters     Patient

## 2024-07-08 ENCOUNTER — HOSPITAL ENCOUNTER (OUTPATIENT)
Dept: PHYSICAL THERAPY | Age: 54
Setting detail: RECURRING SERIES
Discharge: HOME | End: 2024-07-11
Attending: STUDENT IN AN ORGANIZED HEALTH CARE EDUCATION/TRAINING PROGRAM
Payer: OTHER GOVERNMENT

## 2024-07-08 PROCEDURE — 97110 THERAPEUTIC EXERCISES: CPT

## 2024-07-08 ASSESSMENT — PAIN SCALES - GENERAL: PAINLEVEL_OUTOF10: 5

## 2024-07-08 NOTE — PROGRESS NOTES
Activity/Exercise Parameters Parameters Parameters      Patient education/ HEP 8 Updated HEP       LTR 10x  10x   10x with ball  10x hold 5 sec   SKTC 3 x 20 secs; B  2 x 30 sec; B 2 x 30 sec; B     Hamstring stretch 3 x 20sec 2 x 30 sec; B    Supine w strap 2 x 30 sec; B   Calf stretch 3 x 20 sec 2 x 30 sec; B 2 x 30 sec; B 30 sec; B w wedge 2 x 30 sec; B w wedge 2 x 30 sec; B w wedge   Nu Step  5 min L1 for ROM 6 min L1 8 min L1 8 min L1 10 min L2   TA bracing  10x       SLR with QS  2 x 10; B 2 x 10; B 2 x 10; B with Ta 2 x 10; B with Ta 2 x 10 w 1#; B   Pelvic tilt  10x w breath 10x 10x w ball 10x 10x hold 3 sec   Clams  10x;B Hooklying with LTB 10x  SL 10x;B  2 x 10; B in sidelying 10x w OTB; B   Supine march  10x 10x 2 x 10     SAQ  2 x 10;B 10x w hold: B      Heel raises  2 x 10 2 x 10 2 x 10 2 x 10 2 x 10   Hip abd   10x; B  2 x 10; B    SLS    On airex w hip abd 10x; B On airex w hip abd 10x; B     Bridge    Isometric 8X  Beginner 5x   Beginner 10x   10x w ball   Lateral walking    4 x 15 w LTB  4 x 15ft w LTB   Hamstring curl with ball     10x hold 10 sec    Seated lumbar flex     Center and oblique 10x Center and oblique 10x w ball   Piriformis stretch      2 x 30 sec; B     Split Portal Access Code: [email or web address]  URL: [email or web address]  Date: 06/24/2024  Prepared by: Louis Snow    Exercises  - Supine Lower Trunk Rotation  - 2 x daily - 7 x weekly - 1 sets - 10 reps - 3 hold  - Hooklying Single Knee to Chest  - 2 x daily - 7 x weekly - 1 sets - 3 reps - 20 hold  - Supine Posterior Pelvic Tilt  - 1 x daily - 7 x weekly - 1 sets - 10 reps - 3 hold  - Seated Hamstring Stretch  - 2 x daily - 7 x weekly - 1 sets - 20 reps - 3 hold  - Gastroc Stretch on Wall  - 2 x daily - 7 x weekly - 1 sets - 3 reps - 20 hold  - Active Straight Leg Raise with Quad Set  - 1 x daily - 7 x weekly - 2 sets - 10 reps  - Standing Heel Raise with Support  - 1 x daily - 7 x weekly - 2 sets - 10

## 2024-07-10 ENCOUNTER — HOSPITAL ENCOUNTER (OUTPATIENT)
Dept: PHYSICAL THERAPY | Age: 54
Setting detail: RECURRING SERIES
Discharge: HOME | End: 2024-07-13
Attending: STUDENT IN AN ORGANIZED HEALTH CARE EDUCATION/TRAINING PROGRAM
Payer: OTHER GOVERNMENT

## 2024-07-10 PROCEDURE — 97110 THERAPEUTIC EXERCISES: CPT

## 2024-07-10 NOTE — PROGRESS NOTES
Pretty Mago  : 1970  Primary:  [facility]  Secondary:  [facility]  [address]  [address]  [facility] [address]  Phone: [phone]  Fax: [phone] Plan Frequency: 2-3x week for 6 weeks    Plan of Care/Certification Expiration Date: 08/10/24        Plan of Care/Certification Expiration Date:  Plan of Care/Certification Expiration Date: 08/10/24    Frequency/Duration:   Plan Frequency: 2-3x week for 6 weeks      Time In/Out:          PT Visit Info:    Total # of Visits to Date: 6      Visit Count:  7    OUTPATIENT PHYSICAL THERAPY:   Treatment Note 7/10/2024       Episode  (Bilateral Knee pain)               Treatment Diagnosis:    Chronic pain of both knees  Knee joint stiffness, bilateral  Muscle weakness (generalized)  Difficulty in walking, not elsewhere classified  Medical/Referring Diagnosis:    Chondromalacia, patella, 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  Low back pain remains unchanged  Initial Pain Level::      /10  Post Session Pain Level:        /10  Medications Last Reviewed:  7/10/2024  Updated Objective Findings:  None Today  Treatment   THERAPEUTIC EXERCISE: (40 minutes):    Exercises per grid below to improve mobility, strength, and coordination.  Required minimal visual and verbal cues to promote proper body alignment.  Progressed range, repetitions, and complexity of movement as indicated.   Date:  24 Date:  24 Date:  24 Date:  24 Date:  7/3/24 Date:  24 Date:  7/10/24

## 2024-07-15 ENCOUNTER — HOSPITAL ENCOUNTER (OUTPATIENT)
Dept: PHYSICAL THERAPY | Age: 54
Setting detail: RECURRING SERIES
Discharge: HOME | End: 2024-07-18
Attending: STUDENT IN AN ORGANIZED HEALTH CARE EDUCATION/TRAINING PROGRAM
Payer: OTHER GOVERNMENT

## 2024-07-15 PROCEDURE — 97110 THERAPEUTIC EXERCISES: CPT

## 2024-07-15 ASSESSMENT — PAIN SCALES - GENERAL: PAINLEVEL_OUTOF10: 4

## 2024-07-15 NOTE — PROGRESS NOTES
Pretty Mago  : 1970  Primary:  [facility]  Secondary:  [facility]  [address]  [address]  [facility] [address]  Phone: [phone]  Fax: [phone] Plan Frequency: 2-3x week for 6 weeks    Plan of Care/Certification Expiration Date: 08/10/24        Plan of Care/Certification Expiration Date:  Plan of Care/Certification Expiration Date: 08/10/24    Frequency/Duration:   Plan Frequency: 2-3x week for 6 weeks      Time In/Out:   Time In: 0900  Time Out: 09      PT Visit Info:    Total # of Visits to Date: 8      Visit Count:  8    OUTPATIENT PHYSICAL THERAPY:   Treatment Note 7/15/2024       Episode  (Bilateral Knee pain)               Treatment Diagnosis:    Chronic pain of both knees  Knee joint stiffness, bilateral  Muscle weakness (generalized)  Difficulty in walking, not elsewhere classified  Medical/Referring Diagnosis:    Chondromalacia, patella, 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  Low back did feel better post estim and ice last session  Initial Pain Level::     4/10  Post Session Pain Level:       2/10  Medications Last Reviewed:  7/15/2024  Updated Objective Findings:  None Today  Treatment   THERAPEUTIC EXERCISE: (40 minutes):    Exercises per grid below to improve mobility, strength, and coordination.  Required minimal visual and verbal cues to promote proper body alignment.  Progressed range, repetitions, and complexity of movement as indicated.   Date:  24 Date:  24 Date:  24 Date:  24 Date:  7/3/24 Date:  24 Date:  7/10/24 Date:  7/15/24

## 2024-07-17 ENCOUNTER — HOSPITAL ENCOUNTER (OUTPATIENT)
Dept: PHYSICAL THERAPY | Age: 54
Setting detail: RECURRING SERIES
Discharge: HOME | End: 2024-07-20
Attending: STUDENT IN AN ORGANIZED HEALTH CARE EDUCATION/TRAINING PROGRAM
Payer: OTHER GOVERNMENT

## 2024-07-17 PROCEDURE — 97110 THERAPEUTIC EXERCISES: CPT

## 2024-07-17 ASSESSMENT — PAIN SCALES - GENERAL: PAINLEVEL_OUTOF10: 5

## 2024-07-17 NOTE — PROGRESS NOTES
Pretty Mago  : 1970  Primary:  [facility]  Secondary:  [facility]  [address]  [address]  [facility] [address]  Phone: [phone]  Fax: [phone] Plan Frequency: 2-3x week for 6 weeks    Plan of Care/Certification Expiration Date: 08/10/24        Plan of Care/Certification Expiration Date:  Plan of Care/Certification Expiration Date: 08/10/24    Frequency/Duration:   Plan Frequency: 2-3x week for 6 weeks      Time In/Out:   Time In: 0900  Time Out: 945      PT Visit Info:    Total # of Visits to Date: 9      Visit Count:  9    OUTPATIENT PHYSICAL THERAPY:   Treatment Note 2024       Episode  (Bilateral Knee pain)               Treatment Diagnosis:    Chronic pain of both knees  Knee joint stiffness, bilateral  Muscle weakness (generalized)  Difficulty in walking, not elsewhere classified  Medical/Referring Diagnosis:    Chondromalacia, patella, 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  Knee pain also doing be  Initial Pain Level::     5/10  Post Session Pain Level:       5/10  Medications Last Reviewed:  2024  Updated Objective Findings:  None Today  Treatment   THERAPEUTIC EXERCISE: (43 minutes):    Exercises per grid below to improve mobility, strength, and coordination.  Required minimal visual and verbal cues to promote proper body alignment.  Progressed range, repetitions, and complexity of movement as indicated.   Date:  24 Date:  24 Date:  24 Date:  24 Date:  7/3/24 Date:  24 Date:  7/10/24 Date:  7/15/24

## 2024-07-22 ENCOUNTER — HOSPITAL ENCOUNTER (OUTPATIENT)
Dept: PHYSICAL THERAPY | Age: 54
Setting detail: RECURRING SERIES
Discharge: HOME | End: 2024-07-25
Attending: STUDENT IN AN ORGANIZED HEALTH CARE EDUCATION/TRAINING PROGRAM
Payer: OTHER GOVERNMENT

## 2024-07-22 PROCEDURE — 97110 THERAPEUTIC EXERCISES: CPT

## 2024-07-22 NOTE — PROGRESS NOTES
Provider Department [place]   7/24/2024  9:00 AM Louis Snow, PT [facility]   7/26/2024 11:00 AM Louis Snow PT [facility]   7/31/2024  9:00 AM Louis Snow PT [facility]   7/31/2024  2:45 PM Nevin Jenkins MD [facility]

## 2024-07-24 ENCOUNTER — HOSPITAL ENCOUNTER (OUTPATIENT)
Dept: PHYSICAL THERAPY | Age: 54
Setting detail: RECURRING SERIES
Discharge: HOME | End: 2024-07-27
Attending: STUDENT IN AN ORGANIZED HEALTH CARE EDUCATION/TRAINING PROGRAM
Payer: OTHER GOVERNMENT

## 2024-07-24 DIAGNOSIS — R26.2 DIFFICULTY IN WALKING, NOT ELSEWHERE CLASSIFIED: Primary | ICD-10-CM

## 2024-07-24 DIAGNOSIS — G89.29 CHRONIC PAIN OF BOTH KNEES: ICD-10-CM

## 2024-07-24 DIAGNOSIS — M25.561 CHRONIC PAIN OF BOTH KNEES: ICD-10-CM

## 2024-07-24 DIAGNOSIS — M25.562 CHRONIC PAIN OF BOTH KNEES: ICD-10-CM

## 2024-07-24 PROCEDURE — 97110 THERAPEUTIC EXERCISES: CPT

## 2024-07-24 NOTE — PROGRESS NOTES
None  Recommendations/Intent for next treatment session: Next visit will focus on ROM, strength and conditioning, gait and balance training progressing as tolerated.  Manual therapy and modalities as needed.    >Total Treatment Billable Duration:  40 minutes (40 minutes TE)  Time In: 0900  Time Out: 1000    Louis Snow PT         Charge Capture  Events  [employer] Portal  Appt Desk  Attendance Report     Future Appointments   Date Time Provider Department Center   7/26/2024 11:00 AM Louis Snow PT [facility]   7/31/2024  9:00 AM Louis Snow PT [facility]   7/31/2024  2:45 PM Nevin Jenkins MD [facility]

## 2024-07-26 ENCOUNTER — HOSPITAL ENCOUNTER (OUTPATIENT)
Dept: PHYSICAL THERAPY | Age: 54
Setting detail: RECURRING SERIES
Discharge: HOME | End: 2024-07-29
Attending: STUDENT IN AN ORGANIZED HEALTH CARE EDUCATION/TRAINING PROGRAM
Payer: OTHER GOVERNMENT

## 2024-07-26 PROCEDURE — 97110 THERAPEUTIC EXERCISES: CPT

## 2024-07-26 ASSESSMENT — PAIN SCALES - GENERAL: PAINLEVEL_OUTOF10: 5

## 2024-07-26 NOTE — PROGRESS NOTES
Department Center   7/31/2024  9:00 AM Louis Snow, PT [facility]   7/31/2024  2:45 PM Nevin Jenkins MD [facility]

## 2024-07-29 ENCOUNTER — APPOINTMENT (OUTPATIENT)
Dept: PHYSICAL THERAPY | Age: 54
End: 2024-07-29
Attending: STUDENT IN AN ORGANIZED HEALTH CARE EDUCATION/TRAINING PROGRAM
Payer: OTHER GOVERNMENT

## 2024-07-30 NOTE — PROGRESS NOTES
Name: Pretty Mercedes  YOB: 1970  Gender: female  MRN: [id number]  Date of Encounter:  7/31/2024       CHIEF COMPLAINT:     Chief Complaint   Patient presents with    Follow-up     Bilateral Knee        SUBJECTIVE/OBJECTIVE:      HPI:    Patient is a 53 y.o. pleasant female who presents today for a return evaluation of her bilateral knee.    Working diagnosis:   1. Chondromalacia, patella, left    2. Chondromalacia, patella, right       LOV: 6/5/2024     Recall Hx:  Mrs. Mercedes describes bilateral knee pain 2/2 patellar chondromalacia that began a few months ago whenever she is bending/squatting and standing. She notes popping/clicking noises with no pain associated. She cannot recall any DAVID. She denies any previous history of injury or any swelling to the joint. Mrs. Mercedes has never had any knee CSIs or PT. She has chronic history of lumbar spine DDD, stenosis, and radiculopathy.     6/5/24: Given referral for formal therapy. We discussed injections which I advised we hold on for now, trial NSAIDs and therapy.     7/31/24: She has had 12 therapy visits.  She reports some intermittent compliance with her home exercises.  Her PT wanted to discuss extending her PT for 2 to 3 weeks.  They also discussed aqua therapy but she is not really in a position to do that at this time.  She is still very hesitant to pursue any injections.  She does still have pain on a frequent basis.     PAST HISTORY:   Past medical, surgical, family, social history and allergies reviewed by me. Unchanged from prior visit.     REVIEW OF SYSTEMS:   As noted in HPI.     PHYSICAL EXAMINATION:     Gen: Well-developed, no acute distress   HEENT: NC/AT, EOMI   Neck: Trachea midline, normal ROM   CV: Regular rhythm by palpation of distal pulse, normal capillary refill   Pulm: No respiratory distress, no stridor   Psychiatric: Well oriented, normal mood and affect.   Skin: No rashes, lesions or ulcers, normal temperature, turgor, and texture

## 2024-07-31 ENCOUNTER — HOSPITAL ENCOUNTER (OUTPATIENT)
Dept: PHYSICAL THERAPY | Age: 54
Setting detail: RECURRING SERIES
Discharge: HOME | End: 2024-08-03
Attending: STUDENT IN AN ORGANIZED HEALTH CARE EDUCATION/TRAINING PROGRAM
Payer: OTHER GOVERNMENT

## 2024-07-31 ENCOUNTER — OFFICE VISIT (OUTPATIENT)
Dept: ORTHOPEDIC SURGERY | Age: 54
End: 2024-07-31
Payer: OTHER GOVERNMENT

## 2024-07-31 DIAGNOSIS — M22.42 CHONDROMALACIA, PATELLA, LEFT: Primary | ICD-10-CM

## 2024-07-31 DIAGNOSIS — M22.41 CHONDROMALACIA, PATELLA, RIGHT: ICD-10-CM

## 2024-07-31 PROCEDURE — 97110 THERAPEUTIC EXERCISES: CPT

## 2024-07-31 PROCEDURE — 99213 OFFICE O/P EST LOW 20 MIN: CPT | Performed by: STUDENT IN AN ORGANIZED HEALTH CARE EDUCATION/TRAINING PROGRAM

## 2024-07-31 ASSESSMENT — PAIN SCALES - GENERAL: PAINLEVEL_OUTOF10: 5

## 2024-07-31 NOTE — PROGRESS NOTES
Activity/Exercise             Patient education/ HEP             LTR 10x with ball  10x hold 5 sec  10x with ball 10x 10x 10x w ball  10x   SKTC 2 x 30 sec; B            Hamstring stretch   Supine w strap 2 x 30 sec; B    Seated w strap 2 x 30 sec;      Calf stretch 30 sec; B w wedge 2 x 30 sec; B w wedge 2 x 30 sec; B w wedge  2 x 30 sec; B w wedge 2 x 30 sec; B w wedge 2 x 30 sec; B   2 x 30 sec; B   Nu Step 8 min L1 8 min L1 10 min L2 8 min L1 8 min L2 10 min L2 10 min L2   10 min L2   TA bracing        Wall plank with knee bends 10x  Table forearm plank hold 5 secs Seated ball isometrics 10 x hold 3 secs    SLR with QS 2 x 10; B with Ta 2 x 10; B with Ta 2 x 10 w 1#; B 2 x 10 w 1#; B 2 x 10 w 1.5#; B 2 x 10 w 2.0#; B 3 x 10 w 1.5#; B 2 x 15 with 1.5#; B 2 x 15 with 1.5#; B 2 x 15 with 1.5#; B   Pelvic tilt 10x w ball 10x 10x hold 3 sec  10x hold 3 sec   10x 10x 10x   Clams  2 x 10; B in sidelying 10x w OTB; B 10x: B in sidleying 15x; B sidelying 15x; B sidelying w LTB    2 x 10; B   Supine march 2 x 10     2 x 10    2 x 10 w LTB   SAQ     10x w 3 sec hold 10x hold 3 sec w 2#; B       Heel raises 2 x 10 2 x 10 2 x 10 2 x 10 on airex 2 x 10 on airex 2 x 10 on airex 2 x 10 on airex 2 x 10 standing at wall  20x    Hip abd  2 x 10; B  2 x 10 w 1#; B   2 x 10 w 1.5#; B  15x; B sidelying 2 x 10; B   SLS  On airex w hip abd 10x; B    On airex w hip abd 10x; B  On airex w 1.5# hold for 3 sec, 2 x 10; B Standing at door hip ext and abd 10x each; B     Bridge Isometric 8X  Beginner 5x   Beginner 10x   10x w ball 5x 2 x 5 with ball 10x 10x 10x w ball beginner bridge w cues 15x w ball beginner bridge w cues 10x   Lateral walking 4 x 15 w LTB  4 x 15ft w LTB  4 x 15 ft w LTB 4 x 15 ft w LTB 4 x 15 ft w LTB      Hamstring curl with ball  10x hold 10 sec   10x hold 10 sec  3 x 20 knees together and apart for lumbar spine      Seated lumbar flex  Center and oblique 10x Center and oblique 10x w ball Center and oblique 10x w

## 2024-07-31 NOTE — THERAPY RECERTIFICATION
Pretty Mercedes  : 1970  Primary:  [facility]  Secondary:  [facility]  [address]  [address]  [facility] [address]  Phone: [phone]  Fax: [phone] Plan Frequency: 2x week for 4 week    Plan of Care/Certification Expiration Date: 24        Plan of Care/Certification Expiration Date:  Plan of Care/Certification Expiration Date: 24    Frequency/Duration: Plan Frequency: 2x week for 4 week      Time In/Out:   Time In: 0900  Time Out: 0945      PT Visit Info:    Total # of Visits to Date: 13      Visit Count:  13                OUTPATIENT PHYSICAL THERAPY:             Recertification 2024               Episode (Bilateral Knee pain)         Treatment Diagnosis:     Chronic pain of both knees  Knee joint stiffness, bilateral  Muscle weakness (generalized)  Difficulty in walking, not elsewhere classified  Medical/Referring Diagnosis:    Chondromalacia, patella, 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    Current Complaints  Patient reports still having  bilateral knee pain, which began a few months ago whenever she is bending/squatting and standing, reports 40%.  Still not doing housework.   does most of household chores, still limited in standing with meal prep     Patient Stated Goal(s):  \"Reduce pain and imcrease mobility, learn how to manage symptoms\"  Initial Pain Level:      5/10   Post Session Pain Level:     5/10  Past Medical History/Comorbidities:   Ms. Mercedes  has a past medical history of Abnormal pap, Allergic rhinitis, Anemia, Anxiety, Chronic tonsillitis,

## 2024-08-05 ENCOUNTER — HOSPITAL ENCOUNTER (OUTPATIENT)
Dept: PHYSICAL THERAPY | Age: 54
Setting detail: RECURRING SERIES
Discharge: HOME | End: 2024-08-08
Attending: STUDENT IN AN ORGANIZED HEALTH CARE EDUCATION/TRAINING PROGRAM
Payer: OTHER GOVERNMENT

## 2024-08-05 PROCEDURE — 97110 THERAPEUTIC EXERCISES: CPT

## 2024-08-05 ASSESSMENT — PAIN SCALES - GENERAL: PAINLEVEL_OUTOF10: 5

## 2024-08-05 NOTE — PROGRESS NOTES
Pretty Mago  : 1970  Primary:  [facility]  Secondary:  [facility]  [address]  [address]  [facility] [address]  Phone: [phone]  Fax: [phone] Plan Frequency: 2x week for 4 week    Plan of Care/Certification Expiration Date: 24        Plan of Care/Certification Expiration Date:  Plan of Care/Certification Expiration Date: 24    Frequency/Duration:   Plan Frequency: 2x week for 4 week      Time In/Out:   Time In: 1030  Time Out: 1115      PT Visit Info:    Total # of Visits to Date: 14      Visit Count:  14    OUTPATIENT PHYSICAL THERAPY:   Treatment Note 2024       Episode  (Bilateral Knee pain)               Treatment Diagnosis:    Chronic pain of both knees  Knee joint stiffness, bilateral  Muscle weakness (generalized)  Difficulty in walking, not elsewhere classified  Medical/Referring Diagnosis:    Chondromalacia, patella, 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  Reports visiting with her mother over the weekend and did not do her exercises  Initial Pain Level::     5/10  Post Session Pain Level:       5/10  Medications Last Reviewed:  2024  Updated Objective Findings:  None Today  Treatment   THERAPEUTIC EXERCISE: (38 minutes):    Exercises per grid below to improve mobility, strength, and coordination.  Required minimal visual and verbal cues to promote proper body alignment.  Progressed range, repetitions, and complexity of movement as indicated.   Date:  7/10/24 Date:  7/15/24 Date:  24 Date:  24 Date:  24 Date:  24

## 2024-08-07 ENCOUNTER — HOSPITAL ENCOUNTER (OUTPATIENT)
Dept: PHYSICAL THERAPY | Age: 54
Setting detail: RECURRING SERIES
Discharge: HOME | End: 2024-08-10
Attending: STUDENT IN AN ORGANIZED HEALTH CARE EDUCATION/TRAINING PROGRAM
Payer: OTHER GOVERNMENT

## 2024-08-07 PROCEDURE — 97110 THERAPEUTIC EXERCISES: CPT

## 2024-08-07 ASSESSMENT — PAIN SCALES - GENERAL: PAINLEVEL_OUTOF10: 5

## 2024-08-07 NOTE — PROGRESS NOTES
Activity/Exercise           Patient education/ HEP           LTR 10x with ball 10x 10x 10x w ball  10x 10x 10x   SKTC           Hamstring stretch   Seated w strap 2 x 30 sec;        Calf stretch 2 x 30 sec; B w wedge 2 x 30 sec; B w wedge 2 x 30 sec; B   2 x 30 sec; B  2 x 30 sec; B   Nu Step 8 min L2 10 min L2 10 min L2   10 min L2 10 min L2 10 min L2   TA bracing    Wall plank with knee bends 10x  Table forearm plank hold 5 secs Seated ball isometrics 10 x hold 3 secs      SLR with QS 2 x 10 w 1.5#; B 2 x 10 w 2.0#; B 3 x 10 w 1.5#; B 2 x 15 with 1.5#; B 2 x 15 with 1.5#; B 2 x 15 with 1.5#; B 2 x 10 w 2.0#; B 2 x 15 with 1.5#; B   Pelvic tilt 10x hold 3 sec   10x 10x 10x 10x    Clams 15x; B sidelying 15x; B sidelying w LTB    2 x 10; B 2 x 10; B    Supine march  2 x 10    2 x 10 w LTB 20x w OTB    SAQ 10x w 3 sec hold 10x hold 3 sec w 2#; B         Heel raises 2 x 10 on airex 2 x 10 on airex 2 x 10 on airex 2 x 10 standing at wall  20x  20x 20x on airex   Hip abd   2 x 10 w 1.5#; B  15x; B sidelying 2 x 10; B 2 x 10 w OTB 2 x 10; B   SLS  On airex w hip abd 10x; B  On airex w 1.5# hold for 3 sec, 2 x 10; B Standing at door hip ext and abd 10x each; B    On airex w hip abd 10x; B w LTB   Bridge 2 x 5 with ball 10x 10x 10x w ball beginner bridge w cues 15x w ball beginner bridge w cues 10x 2 x 10 w OTB 2 x 10   Lateral walking 4 x 15 ft w LTB 4 x 15 ft w LTB 4 x 15 ft w LTB     4 x 15ft w LTB   Hamstring curl with ball 10x hold 10 sec  3 x 20 knees together and apart for lumbar spine     3 x 20 knees together and apart for lumbar spine   Seated lumbar flex  Center with wide hips 3 x 20 sec     Center with wide hips 3 x 20 sec     Lumbar ext           Piriformis stretch           Prone hip ext over edge of mat           Marching           Shuttle leg press 1Dbl 10x w 50#  Single  assisted 10x w 50#; B          Squats  Sit to stand 3x     Sit to stand x 5     Prone hip ext over edge of mat    10x w 1.5#; B

## 2024-08-12 ENCOUNTER — HOSPITAL ENCOUNTER (OUTPATIENT)
Dept: PHYSICAL THERAPY | Age: 54
Setting detail: RECURRING SERIES
Discharge: HOME | End: 2024-08-15
Attending: STUDENT IN AN ORGANIZED HEALTH CARE EDUCATION/TRAINING PROGRAM
Payer: OTHER GOVERNMENT

## 2024-08-12 PROCEDURE — 97110 THERAPEUTIC EXERCISES: CPT

## 2024-08-12 ASSESSMENT — PAIN SCALES - GENERAL: PAINLEVEL_OUTOF10: 5

## 2024-08-12 NOTE — PROGRESS NOTES
AM Louis Snow, PT [facility]   10/30/2024  2:45 PM Nevin Jenkins MD [facility] AMB

## 2024-08-14 ENCOUNTER — HOSPITAL ENCOUNTER (OUTPATIENT)
Dept: PHYSICAL THERAPY | Age: 54
Setting detail: RECURRING SERIES
Discharge: HOME | End: 2024-08-17
Attending: STUDENT IN AN ORGANIZED HEALTH CARE EDUCATION/TRAINING PROGRAM
Payer: OTHER GOVERNMENT

## 2024-08-14 PROCEDURE — 97110 THERAPEUTIC EXERCISES: CPT

## 2024-08-14 ASSESSMENT — PAIN SCALES - GENERAL: PAINLEVEL_OUTOF10: 5

## 2024-08-14 NOTE — PROGRESS NOTES
Prettykevon Mercedes  : 1970  Primary:  [facility]  Secondary:  [facility]  [address]  [address]  [facility] [address]  Phone: [phone]  Fax: [phone] Plan Frequency: 2x week for 4 week    Plan of Care/Certification Expiration Date: 24        Plan of Care/Certification Expiration Date:  Plan of Care/Certification Expiration Date: 24    Frequency/Duration:   Plan Frequency: 2x week for 4 week      Time In/Out:   Time In: 0945  Time Out: 1030      PT Visit Info:    Total # of Visits to Date: 16      Visit Count:  17    OUTPATIENT PHYSICAL THERAPY:   Treatment Note 2024       Episode  (Bilateral Knee pain)               Treatment Diagnosis:    Chronic pain of both knees  Knee joint stiffness, bilateral  Muscle weakness (generalized)  Difficulty in walking, not elsewhere classified  Medical/Referring Diagnosis:    Chondromalacia, patella, 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  Has not used her home TENS unit yet   Initial Pain Level::     5/10  Post Session Pain Level:       5/10  Medications Last Reviewed:  2024  Updated Objective Findings:  See Discharge Note from today  Treatment   THERAPEUTIC EXERCISE: (40 minutes):    Exercises per grid below to improve mobility, strength, and coordination.  Required minimal visual and verbal cues to promote proper body alignment.  Progressed range, repetitions, and complexity of movement as indicated.   Date:  7/15/24 Date:  24 Date:  24 Date:  24 Date:  24 Date:  24 Date:  24

## 2024-08-14 NOTE — THERAPY DISCHARGE
Pretty Mercedes  : 1970  Primary:  [facility] (Other)  Secondary:  [facility]  [address]  [address]  [facility] [address]  Phone: [phone]  Fax: [phone] Plan Frequency: 2x week for 4 week    Plan of Care/Certification Expiration Date: 24        Plan of Care/Certification Expiration Date:  Plan of Care/Certification Expiration Date: 24    Frequency/Duration: Plan Frequency: 2x week for 4 week      Time In/Out:   Time In: 0945  Time Out: 1030      PT Visit Info:    Total # of Visits to Date: 17      Visit Count:  17                OUTPATIENT PHYSICAL THERAPY:             Discharge Summary 2024               Episode (Bilateral Knee pain)         Treatment Diagnosis:     Chronic pain of both knees  Knee joint stiffness, bilateral  Muscle weakness (generalized)  Difficulty in walking, not elsewhere classified  Medical/Referring Diagnosis:    Chondromalacia, patella, 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    Current Complaints  Patient reports still having  bilateral knee pain, painful bending/squatting and standing, reports 20% improvement overall. Reports she has been trying to do her exercises     Patient Stated Goal(s):  \"Reduce pain and imcrease mobility, learn how to manage symptoms\"  Initial Pain Level:      5/10   Post Session Pain Level:     5/10  Past Medical History/Comorbidities:   Ms. Mercedes  has a past medical history of Abnormal pap, Allergic rhinitis, Anemia, Anxiety, Chronic tonsillitis, Depression, Deviated septum, HTN (hypertension), Hyperlipidemia, Hypothyroid,

## 2024-10-30 ENCOUNTER — OFFICE VISIT (OUTPATIENT)
Dept: ORTHOPEDIC SURGERY | Age: 54
End: 2024-10-30
Payer: OTHER GOVERNMENT

## 2024-10-30 DIAGNOSIS — M22.41 CHONDROMALACIA, PATELLA, RIGHT: ICD-10-CM

## 2024-10-30 DIAGNOSIS — M22.42 CHONDROMALACIA, PATELLA, LEFT: Primary | ICD-10-CM

## 2024-10-30 PROCEDURE — 99213 OFFICE O/P EST LOW 20 MIN: CPT | Performed by: STUDENT IN AN ORGANIZED HEALTH CARE EDUCATION/TRAINING PROGRAM

## 2024-10-30 PROCEDURE — 20611 DRAIN/INJ JOINT/BURSA W/US: CPT | Performed by: STUDENT IN AN ORGANIZED HEALTH CARE EDUCATION/TRAINING PROGRAM

## 2024-10-30 RX ORDER — METHYLPREDNISOLONE ACETATE 40 MG/ML
40 INJECTION, SUSPENSION INTRA-ARTICULAR; INTRALESIONAL; INTRAMUSCULAR; SOFT TISSUE ONCE
Status: COMPLETED | OUTPATIENT
Start: 2024-10-30 | End: 2024-10-30

## 2024-10-30 RX ADMIN — METHYLPREDNISOLONE ACETATE 40 MG: 40 INJECTION, SUSPENSION INTRA-ARTICULAR; INTRALESIONAL; INTRAMUSCULAR; SOFT TISSUE at 15:18

## 2024-10-30 RX ADMIN — METHYLPREDNISOLONE ACETATE 40 MG: 40 INJECTION, SUSPENSION INTRA-ARTICULAR; INTRALESIONAL; INTRAMUSCULAR; SOFT TISSUE at 15:17

## 2024-10-30 NOTE — PROGRESS NOTES
Name: Pretty Mercedes  YOB: 1970  Gender: female  MRN: [id number]  Date of Encounter:  10/30/2024       CHIEF COMPLAINT:     Chief Complaint   Patient presents with    Follow-up     Bilateral Knee        SUBJECTIVE/OBJECTIVE:      HPI:    Patient is a 54 y.o. pleasant female who presents today for a return evaluation of her bilateral knee.    Working diagnosis:   1. Chondromalacia, patella, left    2. Chondromalacia, patella, right         LOV: 6/5/2024     Recall Hx:  Mrs. Mercedes describes bilateral knee pain 2/2 patellar chondromalacia that began a few months ago whenever she is bending/squatting and standing. She notes popping/clicking noises with no pain associated. She cannot recall any DAVID. She denies any previous history of injury or any swelling to the joint. Mrs. Mercedes has never had any knee CSIs or PT. She has chronic history of lumbar spine DDD, stenosis, and radiculopathy.     6/5/24: Given referral for formal therapy. We discussed injections which I advised we hold on for now, trial NSAIDs and therapy.     7/31/24: She has had 12 therapy visits.  She reports some intermittent compliance with her home exercises.  Her PT wanted to discuss extending her PT for 2 to 3 weeks.  They also discussed aqua therapy but she is not really in a position to do that at this time.  She is still very hesitant to pursue any injections.  She does still have pain on a frequent basis.     10/29/24: She has completed her PT and has had really no change or improvement in her knee pain.  She does not note any new symptoms.  She wants to consider injections to the knees today.         PAST HISTORY:   Past medical, surgical, family, social history and allergies reviewed by me. Unchanged from prior visit.     REVIEW OF SYSTEMS:   As noted in HPI.     PHYSICAL EXAMINATION:     Gen: Well-developed, no acute distress   HEENT: NC/AT, EOMI   Neck: Trachea midline, normal ROM   CV: Regular rhythm by palpation of distal pulse,

## 2025-02-03 NOTE — PROGRESS NOTES
Name: Pretty Mercedes  YOB: 1970  Gender: female  MRN: [id number]  Date of Encounter:  2/5/2025       CHIEF COMPLAINT:     Chief Complaint   Patient presents with    Injections     Bilateral Knee        SUBJECTIVE/OBJECTIVE:      HPI:    Patient is a 54 y.o. pleasant female who presents today for a return evaluation of her Bilateral knee.    Working diagnosis:   1. Chondromalacia, patella, left    2. Chondromalacia, patella, right       LOV: 10/30/2024     Recall hx: Mrs. Mercedes describes bilateral knee pain 2/2 patellar chondromalacia that began a few months ago whenever she is bending/squatting and standing. She notes popping/clicking noises with no pain associated. She cannot recall any DAVID. She denies any previous history of injury or any swelling to the joint. Mrs. Mercedes has never had any knee CSIs or PT. She has chronic history of lumbar spine DDD, stenosis, and radiculopathy.      6/5/24: Given referral for formal therapy. We discussed injections which I advised we hold on for now, trial NSAIDs and therapy.      7/31/24: She has had 12 therapy visits.  She reports some intermittent compliance with her home exercises.  Her PT wanted to discuss extending her PT for 2 to 3 weeks.  They also discussed aqua therapy but she is not really in a position to do that at this time.  She is still very hesitant to pursue any injections.  She does still have pain on a frequent basis.      10/29/24: She has completed her PT and has had really no change or improvement in her knee pain.  Bilateral CSI's performed.     2/4/25: Knee injections gave her several weeks of pain relief. She would like to repeat those injections today. She has had increased aching in both knees.        PAST HISTORY:   Past medical, surgical, family, social history and allergies reviewed by me. Unchanged from prior visit.     REVIEW OF SYSTEMS:   As noted in HPI.     PHYSICAL EXAMINATION:     Gen: Well-developed, no acute distress   HEENT:

## 2025-02-04 ENCOUNTER — OFFICE VISIT (OUTPATIENT)
Dept: ORTHOPEDIC SURGERY | Age: 55
End: 2025-02-04
Payer: OTHER GOVERNMENT

## 2025-02-04 DIAGNOSIS — M22.41 CHONDROMALACIA, PATELLA, RIGHT: ICD-10-CM

## 2025-02-04 DIAGNOSIS — M22.42 CHONDROMALACIA, PATELLA, LEFT: Primary | ICD-10-CM

## 2025-02-04 PROCEDURE — 99213 OFFICE O/P EST LOW 20 MIN: CPT | Performed by: STUDENT IN AN ORGANIZED HEALTH CARE EDUCATION/TRAINING PROGRAM

## 2025-02-04 PROCEDURE — 20611 DRAIN/INJ JOINT/BURSA W/US: CPT | Performed by: STUDENT IN AN ORGANIZED HEALTH CARE EDUCATION/TRAINING PROGRAM

## 2025-02-04 RX ORDER — METHYLPREDNISOLONE ACETATE 40 MG/ML
40 INJECTION, SUSPENSION INTRA-ARTICULAR; INTRALESIONAL; INTRAMUSCULAR; SOFT TISSUE ONCE
Status: COMPLETED | OUTPATIENT
Start: 2025-02-04 | End: 2025-02-04

## 2025-02-04 RX ADMIN — METHYLPREDNISOLONE ACETATE 40 MG: 40 INJECTION, SUSPENSION INTRA-ARTICULAR; INTRALESIONAL; INTRAMUSCULAR; SOFT TISSUE at 13:41

## 2025-05-06 NOTE — PROGRESS NOTES
Name: Pretty Mercedes  YOB: 1970  Gender: female  MRN: [id number]  Date of Encounter:  5/7/2025       CHIEF COMPLAINT:     Chief Complaint   Patient presents with    Follow-up     B Knees        SUBJECTIVE/OBJECTIVE:      HPI:    Patient is a 54 y.o. pleasant female who presents today for an injection of the bilateral knees .      Recall hx: Mrs. Mercedes describes bilateral knee pain 2/2 patellar chondromalacia that began a few months ago whenever she is bending/squatting and standing. She notes popping/clicking noises with no pain associated. She cannot recall any DAVID. She denies any previous history of injury or any swelling to the joint. Mrs. Mercedes has never had any knee CSIs or PT. She has chronic history of lumbar spine DDD, stenosis, and radiculopathy.      6/5/24: Given referral for formal therapy. We discussed injections which I advised we hold on for now, trial NSAIDs and therapy.      7/31/24: She has had 12 therapy visits.  She reports some intermittent compliance with her home exercises.  Her PT wanted to discuss extending her PT for 2 to 3 weeks.  They also discussed aqua therapy but she is not really in a position to do that at this time.  She is still very hesitant to pursue any injections.  She does still have pain on a frequent basis.      10/29/24: She has completed her PT and has had really no change or improvement in her knee pain.  Bilateral CSI's performed.      2/4/25: Knee injections gave her several weeks of pain relief. She has had increased aching in both knees. CSI's repeated today.     5/7/25: She continues to get approximately 2-1/2 months of pain relief from injections that is fairly good but has recurrence of aching in the knees.  She does wish to repeat the injections today.  Review issues or incidents.        ASSESSMENT/PLAN:     Encounter Diagnoses   Name Primary?    Chondromalacia, patella, left Yes    Chondromalacia, patella, right         We discussed bilateral knee

## 2025-05-07 ENCOUNTER — OFFICE VISIT (OUTPATIENT)
Dept: ORTHOPEDIC SURGERY | Age: 55
End: 2025-05-07
Payer: OTHER GOVERNMENT

## 2025-05-07 DIAGNOSIS — M22.42 CHONDROMALACIA, PATELLA, LEFT: Primary | ICD-10-CM

## 2025-05-07 DIAGNOSIS — M22.41 CHONDROMALACIA, PATELLA, RIGHT: ICD-10-CM

## 2025-05-07 PROCEDURE — 20611 DRAIN/INJ JOINT/BURSA W/US: CPT | Performed by: STUDENT IN AN ORGANIZED HEALTH CARE EDUCATION/TRAINING PROGRAM

## 2025-05-07 RX ORDER — METHYLPREDNISOLONE ACETATE 40 MG/ML
40 INJECTION, SUSPENSION INTRA-ARTICULAR; INTRALESIONAL; INTRAMUSCULAR; SOFT TISSUE ONCE
Status: COMPLETED | OUTPATIENT
Start: 2025-05-07 | End: 2025-05-07

## 2025-05-07 RX ADMIN — METHYLPREDNISOLONE ACETATE 40 MG: 40 INJECTION, SUSPENSION INTRA-ARTICULAR; INTRALESIONAL; INTRAMUSCULAR; SOFT TISSUE at 15:18

## 2025-05-22 NOTE — PROGRESS NOTES
[facility]  Consultation Note    Patient ID:  Name: Pretty Mercedes  MRN: [id number]  AGE: 54 y.o.  : 1970    Date of Consultation:  May 28, 2025    CC:   Chief Complaint   Patient presents with    Back Pain         HPI:  Ms. Mercedes is a 54 year old female who presents today for follow up of low back pain.  She reports persistent dorsalgia, which was alleviated by previous bilateral L4-5 transforaminal epidural steroid injections, most recently performed May 2, 2024.  These injections provided significant pain relief and improvement in function. The pain is localized to her lumbar region, hips, and occasionally radiates to her thighs. She does not experience any associated paresthesia. Her current medication regimen for pain relief includes meloxicam and tizanidine. She has been receiving intra-articular injections in her knees, which provide temporary relief for approximately 2.5 months. She anticipates returning for additional knee injections in 3 weeks. The low back pain is exacerbated by various activities, including cooking and prolonged standing, such as during [employer] services. She frequently shifts her weight from one leg to the other due to discomfort. The pain occasionally disrupts her sleep. She also reports fluid retention, as evidenced by sock lines upon removal. She is currently on a diuretic.    MRI lumbar spine 2022 showed multilevel degenerative disc changes, spondylosis, facet arthropathy of the lumbar spine with lateral recess narrowing bilaterally at L4-5.      Past Medical History Includes:   Past Medical History:   Diagnosis Date    Abnormal pap     colposcopy normal     Allergic rhinitis     Anemia     Anxiety     Chronic tonsillitis     Depression     meds since approx     Deviated septum     HTN (hypertension)     Hyperlipidemia     Hypothyroid     Interstitial cystitis     since     Joint pain     Kidney infection     Pneumonia     Thyroid disease

## 2025-05-28 ENCOUNTER — OFFICE VISIT (OUTPATIENT)
Dept: ORTHOPEDIC SURGERY | Age: 55
End: 2025-05-28
Payer: OTHER GOVERNMENT

## 2025-05-28 VITALS — WEIGHT: 230 LBS | HEIGHT: 66 IN | BODY MASS INDEX: 36.96 KG/M2

## 2025-05-28 DIAGNOSIS — M48.062 SPINAL STENOSIS OF LUMBAR REGION WITH NEUROGENIC CLAUDICATION: ICD-10-CM

## 2025-05-28 DIAGNOSIS — M47.816 LUMBAR SPONDYLOSIS: Primary | ICD-10-CM

## 2025-05-28 DIAGNOSIS — M51.362 DEGENERATION OF INTERVERTEBRAL DISC OF LUMBAR REGION WITH DISCOGENIC BACK PAIN AND LOWER EXTREMITY PAIN: ICD-10-CM

## 2025-05-28 DIAGNOSIS — M54.16 LUMBAR RADICULOPATHY: ICD-10-CM

## 2025-05-28 PROCEDURE — 99214 OFFICE O/P EST MOD 30 MIN: CPT | Performed by: PHYSICAL MEDICINE & REHABILITATION

## 2025-05-28 RX ORDER — MELOXICAM 15 MG/1
15 TABLET ORAL DAILY
Qty: 30 TABLET | Refills: 1 | Status: SHIPPED | OUTPATIENT
Start: 2025-05-28

## 2025-05-28 RX ORDER — ALPRAZOLAM 1 MG/1
TABLET ORAL
Qty: 1 TABLET | Refills: 0 | Status: SHIPPED | OUTPATIENT
Start: 2025-05-28 | End: 2025-06-30

## 2025-06-10 ENCOUNTER — OFFICE VISIT (OUTPATIENT)
Dept: ORTHOPEDIC SURGERY | Age: 55
End: 2025-06-10
Payer: OTHER GOVERNMENT

## 2025-06-10 DIAGNOSIS — M48.062 SPINAL STENOSIS OF LUMBAR REGION WITH NEUROGENIC CLAUDICATION: ICD-10-CM

## 2025-06-10 DIAGNOSIS — M47.816 LUMBAR SPONDYLOSIS: Primary | ICD-10-CM

## 2025-06-10 PROCEDURE — 64483 NJX AA&/STRD TFRM EPI L/S 1: CPT | Performed by: PHYSICAL MEDICINE & REHABILITATION

## 2025-06-10 RX ORDER — TRIAMCINOLONE ACETONIDE 40 MG/ML
40 INJECTION, SUSPENSION INTRA-ARTICULAR; INTRAMUSCULAR ONCE
Status: COMPLETED | OUTPATIENT
Start: 2025-06-10 | End: 2025-06-10

## 2025-06-10 RX ADMIN — TRIAMCINOLONE ACETONIDE 40 MG: 40 INJECTION, SUSPENSION INTRA-ARTICULAR; INTRAMUSCULAR at 16:33

## 2025-06-10 NOTE — PROGRESS NOTES
Name: Pretty Mercedes  YOB: 1970  Gender: female  MRN: [id number]        Transforaminal MILAGROS Procedure Note    Procedure: Bilateral  L4-L5 transforaminal epidural steroid injections     Precautions: Pretty Mercedes denies prior sensitivity to steroid, local anesthetic, iodine, or shellfish.       Consent:  Consent was obtained prior to the procedure. The procedure was discussed at length with Pretty Mercedes. She was given the opportunity to ask questions regarding the procedure and its associated risks.  In addition to the potential risks associated with the procedure itself, the patient was informed both verbally and in writing of potential side effects of the use glucocorticoids.  The patient appeared to comprehend the informed consent and desired to have the procedure performed, and informed consent was signed.     She was placed in a prone position on the fluoroscopy table and the skin was prepped and draped in a routine sterile fashion. The areas to be injected were each anesthetized with 1 ml of 1% Lidocaine. A 22 gauge 3.5 inch spinal needle was carefully advanced under fluoroscopic guidance to the right L4-L5 transforaminal space  0.5 ml of 70% of Omnipaque was injected to confirm proper needle placement and absence of subdural or vascular flow Once proper placement was confirmed, 0.5ml of 0.25 marcaine and 40 mg kenalog were injected through the spinal needle.       The above procedure was then repeated at the Left  L4-L5 transforaminal space.    Fluoroscopic guidance was used intermittently over a 10-minute period to insure proper needle placement and her safety. A hard copy of the fluoroscopic image has been placed in her chart and is saved on the C-arm hard drive. She was monitored after the procedure and discharged home in a stable fashion with a routine follow up.    Procedural Diagnosis:     ICD-10-CM    1. Lumbar spondylosis  M47.816 FL NERVE BLOCK LUMBOSACRAL 1ST     triamcinolone

## 2025-06-23 NOTE — PROGRESS NOTES
Name: Pretty Mercedes  YOB: 1970  Gender: female  MRN: [id number]  Date of Encounter:  6/25/2025       CHIEF COMPLAINT:     Chief Complaint   Patient presents with    Injections     B Knee (Durolane)        SUBJECTIVE/OBJECTIVE:      HPI:    Patient is a 54 y.o. pleasant female who presents today for a Durolane injection of the bilateral knee, #1    Mrs. Mercedes has bilateral knee pain 2/2 patellar chondromalacia. Knee pain is typically worse with bending/squatting and standing. She notes popping/clicking noises with no pain associated. She cannot recall any DAVID. She denies any previous history of injury or any swelling to the joint. She has chronic history of lumbar spine DDD, stenosis, and radiculopathy.      6/5/24: Given referral for formal therapy. We discussed injections which I advised we hold on for now, trial NSAIDs and therapy.      7/31/24: She has had 12 therapy visits.  She reports some intermittent compliance with her home exercises.  Her PT wanted to discuss extending her PT for 2 to 3 weeks.  They also discussed aqua therapy but she is not really in a position to do that at this time.  She is still very hesitant to pursue any injections.  She does still have pain on a frequent basis.      10/29/24: She has completed her PT and has had really no change or improvement in her knee pain.  Bilateral CSI's performed.      2/4/25: Knee injections gave her several weeks of pain relief. She has had increased aching in both knees. CSI's repeated today.      5/7/25: She continues to get approximately 2-1/2 months of pain relief from injections that is fairly good but has recurrence of aching in the knees.  B CSI's today.     6/25/25: bilateral durloane inj    1. Chondromalacia, patella, left    2. Chondromalacia, patella, right        Bilateral Knee Injection - US guided      An injection of Durolane viscosupplement was discussed today and is indicated for patient’s pain and condition today. Risks

## 2025-06-25 ENCOUNTER — OFFICE VISIT (OUTPATIENT)
Dept: ORTHOPEDIC SURGERY | Age: 55
End: 2025-06-25
Payer: OTHER GOVERNMENT

## 2025-06-25 DIAGNOSIS — M22.42 CHONDROMALACIA, PATELLA, LEFT: Primary | ICD-10-CM

## 2025-06-25 DIAGNOSIS — M22.41 CHONDROMALACIA, PATELLA, RIGHT: ICD-10-CM

## 2025-06-25 PROCEDURE — 20611 DRAIN/INJ JOINT/BURSA W/US: CPT | Performed by: STUDENT IN AN ORGANIZED HEALTH CARE EDUCATION/TRAINING PROGRAM

## 2025-08-13 ENCOUNTER — OFFICE VISIT (OUTPATIENT)
Dept: ORTHOPEDIC SURGERY | Age: 55
End: 2025-08-13

## 2025-08-13 DIAGNOSIS — M22.42 CHONDROMALACIA, PATELLA, LEFT: Primary | ICD-10-CM

## 2025-08-13 DIAGNOSIS — M22.41 CHONDROMALACIA, PATELLA, RIGHT: ICD-10-CM

## 2025-08-13 RX ORDER — METHYLPREDNISOLONE ACETATE 40 MG/ML
40 INJECTION, SUSPENSION INTRA-ARTICULAR; INTRALESIONAL; INTRAMUSCULAR; SOFT TISSUE ONCE
Status: COMPLETED | OUTPATIENT
Start: 2025-08-13 | End: 2025-08-13

## 2025-08-13 RX ADMIN — METHYLPREDNISOLONE ACETATE 40 MG: 40 INJECTION, SUSPENSION INTRA-ARTICULAR; INTRALESIONAL; INTRAMUSCULAR; SOFT TISSUE at 09:37
